# Patient Record
Sex: MALE | Race: WHITE | NOT HISPANIC OR LATINO | URBAN - METROPOLITAN AREA
[De-identification: names, ages, dates, MRNs, and addresses within clinical notes are randomized per-mention and may not be internally consistent; named-entity substitution may affect disease eponyms.]

---

## 2018-06-02 ENCOUNTER — EMERGENCY (EMERGENCY)
Facility: HOSPITAL | Age: 33
LOS: 0 days | Discharge: HOME | End: 2018-06-02
Attending: EMERGENCY MEDICINE | Admitting: EMERGENCY MEDICINE

## 2018-06-02 VITALS
DIASTOLIC BLOOD PRESSURE: 72 MMHG | HEART RATE: 105 BPM | TEMPERATURE: 100 F | RESPIRATION RATE: 20 BRPM | OXYGEN SATURATION: 99 % | SYSTOLIC BLOOD PRESSURE: 116 MMHG

## 2018-06-02 DIAGNOSIS — K02.9 DENTAL CARIES, UNSPECIFIED: ICD-10-CM

## 2018-06-02 DIAGNOSIS — K08.89 OTHER SPECIFIED DISORDERS OF TEETH AND SUPPORTING STRUCTURES: ICD-10-CM

## 2018-06-02 DIAGNOSIS — R22.0 LOCALIZED SWELLING, MASS AND LUMP, HEAD: ICD-10-CM

## 2018-06-02 DIAGNOSIS — K03.81 CRACKED TOOTH: ICD-10-CM

## 2018-06-02 RX ORDER — IBUPROFEN 200 MG
1 TABLET ORAL
Qty: 21 | Refills: 0 | OUTPATIENT
Start: 2018-06-02 | End: 2018-06-08

## 2018-06-02 RX ORDER — AMOXICILLIN 250 MG/5ML
1 SUSPENSION, RECONSTITUTED, ORAL (ML) ORAL
Qty: 21 | Refills: 0 | OUTPATIENT
Start: 2018-06-02 | End: 2018-06-08

## 2018-06-02 NOTE — ED PROVIDER NOTE - ATTENDING CONTRIBUTION TO CARE
Dental pain. On exam extremely poor dentition, fractured and cavities. Noted. No submandibular swelling. Pt will follow up dental Monday. Antibiotics for now.

## 2018-06-02 NOTE — CONSULT NOTE ADULT - ASSESSMENT
Mild left submandibular swelling noted. Mild intraoral swelling along the lower left vestibule. Poor oral hygiene. Multiple caries, multiple broken teeth. Coronal portions of #17, 18, 19 are decayed and fractured involving the pulp. Dental block offered but denied.    Recommend follow up in [facility] for radiographic evaluation and definitive treatment. Recommend Amoxicillin 500mg every 8 hours 7 days, Ibuprofen 600mg every 6 hours as needed for pain.

## 2018-06-02 NOTE — ED PROVIDER NOTE - OBJECTIVE STATEMENT
left lower tooth pain for 1 month, self treated with peroxide rinses and intermittent relief/resolution  worse over the last 5 days with swelling, n/v/f/c etc  no other sxs/concerns/issues

## 2018-06-02 NOTE — CONSULT NOTE ADULT - SUBJECTIVE AND OBJECTIVE BOX
33 year old male presents with dental pain and mild mandibular swelling on lower left. Complains of throbbing pain for several weeks. Reports using hydrogen peroxide products and warm salt water rinses, which do not alleviate the symptoms.

## 2018-06-04 ENCOUNTER — OUTPATIENT (OUTPATIENT)
Dept: OUTPATIENT SERVICES | Facility: HOSPITAL | Age: 33
LOS: 1 days | Discharge: HOME | End: 2018-06-04

## 2018-06-05 DIAGNOSIS — K04.7 PERIAPICAL ABSCESS WITHOUT SINUS: ICD-10-CM

## 2019-08-06 NOTE — ED ADULT NURSE NOTE - NS ED NURSE LEVEL OF CONSCIOUSNESS ORIENTATION
"Subjective:       Patient ID: Rashad Patton is a 49 y.o. male.    Chief Complaint: Rib Pain (pain under left side of rib that radiates to his back. He says it feels better after belching )    Epigastric and LUQ discomfort for the past week, sometimes radiates to back. Has been belching more than usual, provides temporary relief, as did Tums. No change in diet or bowels. No melena or hematochezia. No CP or SOB, no cough    Review of Systems   Constitutional: Negative for fever.   HENT: Negative for trouble swallowing.    Respiratory: Negative for shortness of breath.    Cardiovascular: Negative for chest pain.   Gastrointestinal: Negative for blood in stool and vomiting.   Genitourinary: Negative for difficulty urinating.   Allergic/Immunologic: Negative for immunocompromised state.       Objective:      Vitals:    08/06/19 0937   BP: 130/86   BP Location: Left arm   Patient Position: Sitting   BP Method: Large (Automatic)   Pulse: 76   Resp: 18   Temp: 98.9 °F (37.2 °C)   TempSrc: Oral   SpO2: 99%   Weight: 96.6 kg (213 lb)   Height: 5' 8" (1.727 m)     Physical Exam   Constitutional: He is oriented to person, place, and time. He appears well-developed and well-nourished.   HENT:   Head: Normocephalic and atraumatic.   Cardiovascular: Normal rate, regular rhythm and normal heart sounds.   Pulmonary/Chest: Effort normal and breath sounds normal.   Abdominal: Soft. Bowel sounds are normal. He exhibits no distension. There is tenderness (mild epigastric and LUQ). There is no rebound and no guarding.   Musculoskeletal: He exhibits no edema.   Neurological: He is alert and oriented to person, place, and time.   Skin: Skin is warm and dry.   Psychiatric: He has a normal mood and affect. His behavior is normal.   Nursing note and vitals reviewed.      Assessment:       1. Dyspepsia        Plan:       Dyspepsia  Comments:  likely gastritis/GERD  Orders:  -     pantoprazole (PROTONIX) 40 MG tablet; Take 1 tablet (40 mg " total) by mouth once daily. for 14 days  Dispense: 14 tablet; Refill: 0  May need EGD if symptoms persist or recur    Medication List with Changes/Refills   New Medications    PANTOPRAZOLE (PROTONIX) 40 MG TABLET    Take 1 tablet (40 mg total) by mouth once daily. for 14 days   Current Medications    EPINEPHRINE (EPIPEN 2-EDITH) 0.3 MG/0.3 ML ATIN    Inject 0.3 mLs (0.3 mg total) into the muscle once.          Oriented - self; Oriented - place; Oriented - time

## 2025-07-11 ENCOUNTER — NON-APPOINTMENT (OUTPATIENT)
Age: 40
End: 2025-07-11

## 2025-07-11 ENCOUNTER — APPOINTMENT (OUTPATIENT)
Dept: ELECTROPHYSIOLOGY | Facility: CLINIC | Age: 40
End: 2025-07-11

## 2025-07-11 VITALS — WEIGHT: 146 LBS | BODY MASS INDEX: 22.91 KG/M2 | HEIGHT: 67 IN

## 2025-07-11 VITALS — SYSTOLIC BLOOD PRESSURE: 116 MMHG | DIASTOLIC BLOOD PRESSURE: 75 MMHG

## 2025-07-11 VITALS — HEART RATE: 52 BPM

## 2025-07-11 PROCEDURE — 99204 OFFICE O/P NEW MOD 45 MIN: CPT

## 2025-07-11 PROCEDURE — G2211 COMPLEX E/M VISIT ADD ON: CPT | Mod: NC

## 2025-07-11 PROCEDURE — 93000 ELECTROCARDIOGRAM COMPLETE: CPT
